# Patient Record
Sex: FEMALE
[De-identification: names, ages, dates, MRNs, and addresses within clinical notes are randomized per-mention and may not be internally consistent; named-entity substitution may affect disease eponyms.]

---

## 2017-05-27 ENCOUNTER — HEALTH MAINTENANCE LETTER (OUTPATIENT)
Age: 27
End: 2017-05-27

## 2020-07-15 ENCOUNTER — TELEPHONE (OUTPATIENT)
Dept: OPHTHALMOLOGY | Facility: CLINIC | Age: 30
End: 2020-07-15

## 2020-07-15 NOTE — TELEPHONE ENCOUNTER
Spoke to pt at 1615    Auro/kaliedascope effect lasting 30 minutes then resolved for 5 minutes, then returned for one hour with migraine headache-- vision normalized   Some persistent headache today    H/o migraines    Reviewed s/s of ocular migraines.  Offered appt next week vs monitoring for any reoccurrences/symptoms/vision changes    After review pt will hold on appt for now and follow up with provider managing migraines    After speaking with pt, reviewed plan with Dr. Godoy who agreed with plan    Stas Martinez RN 4:30 PM 07/15/20    ---    Called secondary number to call at [phone] and  stated pt at work and would forward triage number for pt to call   elaborated stating pt had a large floater in eye this AM for a half hour then went away and had headache following    Will review with pt on callback    Stas Martinez RN 3:51 PM 07/15/20    ---      Candelaria Lopez [phone]      Left message with direct number at [phone]    Stas Martinez RN 10:22 AM 07/15/20         [facility]    Phone Message    May a detailed message be left on voicemail: yes     Reason for Call: Symptoms or Concerns     If patient has red-flag symptoms, warm transfer to triage line    Current symptom or concern: Floaters and [facility]    Symptoms have been present for:  ? day(s)    Has patient previously been seen for this? No    By : N/A    Date: N/A    Are there any new or worsening symptoms? Yes  Secondary # to [phone]      Action Taken: Message routed to:  [facility] eye    Travel Screening: Not Applicable